# Patient Record
Sex: FEMALE | Race: OTHER | HISPANIC OR LATINO
[De-identification: names, ages, dates, MRNs, and addresses within clinical notes are randomized per-mention and may not be internally consistent; named-entity substitution may affect disease eponyms.]

---

## 2017-06-06 ENCOUNTER — FORM ENCOUNTER (OUTPATIENT)
Age: 47
End: 2017-06-06

## 2017-11-21 ENCOUNTER — FORM ENCOUNTER (OUTPATIENT)
Age: 47
End: 2017-11-21

## 2018-03-13 ENCOUNTER — FORM ENCOUNTER (OUTPATIENT)
Age: 48
End: 2018-03-13

## 2018-10-31 ENCOUNTER — FORM ENCOUNTER (OUTPATIENT)
Age: 48
End: 2018-10-31

## 2019-01-14 ENCOUNTER — EMERGENCY (EMERGENCY)
Facility: HOSPITAL | Age: 49
LOS: 1 days | Discharge: HOME | End: 2019-01-14
Admitting: EMERGENCY MEDICINE
Payer: MEDICAID

## 2019-01-14 VITALS
HEIGHT: 59 IN | DIASTOLIC BLOOD PRESSURE: 77 MMHG | WEIGHT: 143.08 LBS | HEART RATE: 86 BPM | SYSTOLIC BLOOD PRESSURE: 118 MMHG | OXYGEN SATURATION: 98 % | TEMPERATURE: 99 F | RESPIRATION RATE: 18 BRPM

## 2019-01-14 DIAGNOSIS — R51 HEADACHE: ICD-10-CM

## 2019-01-14 DIAGNOSIS — H57.89 OTHER SPECIFIED DISORDERS OF EYE AND ADNEXA: ICD-10-CM

## 2019-01-14 LAB
ANION GAP SERPL CALC-SCNC: 10 MMOL/L — SIGNIFICANT CHANGE UP (ref 9–16)
BASOPHILS NFR BLD AUTO: 1 % — SIGNIFICANT CHANGE UP (ref 0–2)
BUN SERPL-MCNC: 19 MG/DL — SIGNIFICANT CHANGE UP (ref 7–23)
CALCIUM SERPL-MCNC: 9.2 MG/DL — SIGNIFICANT CHANGE UP (ref 8.5–10.5)
CHLORIDE SERPL-SCNC: 104 MMOL/L — SIGNIFICANT CHANGE UP (ref 96–108)
CO2 SERPL-SCNC: 24 MMOL/L — SIGNIFICANT CHANGE UP (ref 22–31)
CREAT SERPL-MCNC: 0.67 MG/DL — SIGNIFICANT CHANGE UP (ref 0.5–1.3)
EOSINOPHIL NFR BLD AUTO: 5.1 % — SIGNIFICANT CHANGE UP (ref 0–6)
GLUCOSE SERPL-MCNC: 98 MG/DL — SIGNIFICANT CHANGE UP (ref 70–99)
HCT VFR BLD CALC: 38.7 % — SIGNIFICANT CHANGE UP (ref 34.5–45)
HGB BLD-MCNC: 13 G/DL — SIGNIFICANT CHANGE UP (ref 11.5–15.5)
IMM GRANULOCYTES NFR BLD AUTO: 0.2 % — SIGNIFICANT CHANGE UP (ref 0–1.5)
LYMPHOCYTES # BLD AUTO: 49.5 % — HIGH (ref 13–44)
MCHC RBC-ENTMCNC: 31.6 PG — SIGNIFICANT CHANGE UP (ref 27–34)
MCHC RBC-ENTMCNC: 33.6 G/DL — SIGNIFICANT CHANGE UP (ref 32–36)
MCV RBC AUTO: 94.2 FL — SIGNIFICANT CHANGE UP (ref 80–100)
MONOCYTES NFR BLD AUTO: 8 % — SIGNIFICANT CHANGE UP (ref 2–14)
NEUTROPHILS NFR BLD AUTO: 36.2 % — LOW (ref 43–77)
PLATELET # BLD AUTO: 237 K/UL — SIGNIFICANT CHANGE UP (ref 150–400)
POTASSIUM SERPL-MCNC: 4.1 MMOL/L — SIGNIFICANT CHANGE UP (ref 3.5–5.3)
POTASSIUM SERPL-SCNC: 4.1 MMOL/L — SIGNIFICANT CHANGE UP (ref 3.5–5.3)
RBC # BLD: 4.11 M/UL — SIGNIFICANT CHANGE UP (ref 3.8–5.2)
RBC # FLD: 11.9 % — SIGNIFICANT CHANGE UP (ref 10.3–14.5)
SODIUM SERPL-SCNC: 138 MMOL/L — SIGNIFICANT CHANGE UP (ref 132–145)
WBC # BLD: 5.2 K/UL — SIGNIFICANT CHANGE UP (ref 3.8–10.5)
WBC # FLD AUTO: 5.2 K/UL — SIGNIFICANT CHANGE UP (ref 3.8–10.5)

## 2019-01-14 PROCEDURE — 99284 EMERGENCY DEPT VISIT MOD MDM: CPT

## 2019-01-14 RX ORDER — ACETAMINOPHEN 500 MG
650 TABLET ORAL ONCE
Qty: 0 | Refills: 0 | Status: COMPLETED | OUTPATIENT
Start: 2019-01-14 | End: 2019-01-14

## 2019-01-14 RX ORDER — SODIUM CHLORIDE 9 MG/ML
1000 INJECTION INTRAMUSCULAR; INTRAVENOUS; SUBCUTANEOUS ONCE
Qty: 0 | Refills: 0 | Status: COMPLETED | OUTPATIENT
Start: 2019-01-14 | End: 2019-01-14

## 2019-01-14 RX ORDER — METOCLOPRAMIDE HCL 10 MG
10 TABLET ORAL ONCE
Qty: 0 | Refills: 0 | Status: COMPLETED | OUTPATIENT
Start: 2019-01-14 | End: 2019-01-14

## 2019-01-14 RX ORDER — ACETAMINOPHEN 500 MG
2 TABLET ORAL
Qty: 30 | Refills: 0 | OUTPATIENT
Start: 2019-01-14 | End: 2019-01-18

## 2019-01-14 RX ORDER — METOCLOPRAMIDE HCL 10 MG
1 TABLET ORAL
Qty: 15 | Refills: 0 | OUTPATIENT
Start: 2019-01-14 | End: 2019-01-18

## 2019-01-14 RX ADMIN — Medication 10 MILLIGRAM(S): at 17:35

## 2019-01-14 RX ADMIN — Medication 650 MILLIGRAM(S): at 17:35

## 2019-01-14 RX ADMIN — SODIUM CHLORIDE 2000 MILLILITER(S): 9 INJECTION INTRAMUSCULAR; INTRAVENOUS; SUBCUTANEOUS at 17:35

## 2019-01-14 NOTE — ED PROVIDER NOTE - MEDICAL DECISION MAKING DETAILS
headache, likely migraine type.  Given iv fluids, tylenol and reglan with relief.  Labs unremarkable. No indication for imagining at this time.  Pt with nonfocal exam.  F/u with neurology for possible mri and eval.  no s/s of infection or other etiology

## 2019-01-14 NOTE — ED ADULT NURSE NOTE - NSIMPLEMENTINTERV_GEN_ALL_ED
Implemented All Universal Safety Interventions:  [place] to call system. Call bell, personal items and telephone within reach. Instruct patient to call for assistance. Room bathroom lighting operational. Non-slip footwear when patient is off stretcher. Physically safe environment: no spills, clutter or unnecessary equipment. Stretcher in lowest position, wheels locked, appropriate side rails in place.

## 2019-01-14 NOTE — ED ADULT NURSE NOTE - CHPI ED NUR SYMPTOMS NEG
no fever/no nausea/no numbness/no dizziness/no blurred vision/no confusion/no vomiting/no weakness/no loss of consciousness/no change in level of consciousness

## 2019-01-14 NOTE — ED ADULT TRIAGE NOTE - CHIEF COMPLAINT QUOTE
Pt presents to ED with c/o headache with bilateral blurred vision and sensitivity to light x 2 days. Denies fevers, chills or neck pain. Pt has had headaches like this in the past, tried Motrin with minimal relief. Neuro intact.

## 2019-01-14 NOTE — ED ADULT NURSE NOTE - OBJECTIVE STATEMENT
48 y.o F presents to ED with complaints of headache x 2 days. Pt reports similar symptoms 3x in past with relief taking ibuprofen. Pt took ibuprofen yesterday with no relief. No medications for pain today. Pt reports headache worse with light exposure. Denies N/V, dizziness ,fever/chills, neck pain, back pain.

## 2019-01-14 NOTE — ED PROVIDER NOTE - OBJECTIVE STATEMENT
49 y/o F w/ pmhx of breast cancer presents to ED w/ c/o "very severe" headache w/ sensitivity to light x2 days. Pt states that she has had this kind of headache twice before. Usually takes motrin for ha, has not taken anything today for pain. Denies fever, congestion, cough, weakness. NKA. 49 y/o F w/ pmhx of breast cancer presents to ED w/ c/o "very severe" headache w/ sensitivity to light x2 days. Pt states that she has had this kind of headache twice before and resolved with motrin and time and was told she had migraine headaches. Usually takes motrin for ha, has not taken anything today for pain. Denies fever, congestion, cough, weakness. NKA.

## 2019-01-14 NOTE — ED PROVIDER NOTE - NSFOLLOWUPCLINICS_GEN_ALL_ED_FT
[facility]  Neurology  [address] [place] [address]  Phone: [phone]  Fax: [phone]  Follow Up Time:

## 2019-05-01 ENCOUNTER — FORM ENCOUNTER (OUTPATIENT)
Age: 49
End: 2019-05-01

## 2019-11-25 ENCOUNTER — FORM ENCOUNTER (OUTPATIENT)
Age: 49
End: 2019-11-25

## 2021-01-08 PROBLEM — C50.919 MALIGNANT NEOPLASM OF UNSPECIFIED SITE OF UNSPECIFIED FEMALE BREAST: Chronic | Status: ACTIVE | Noted: 2019-01-14

## 2021-06-11 PROBLEM — Z00.00 ENCOUNTER FOR PREVENTIVE HEALTH EXAMINATION: Status: ACTIVE | Noted: 2021-06-11

## 2021-06-14 ENCOUNTER — APPOINTMENT (OUTPATIENT)
Dept: BREAST CENTER | Facility: CLINIC | Age: 51
End: 2021-06-14
Payer: MEDICAID

## 2021-06-14 ENCOUNTER — NON-APPOINTMENT (OUTPATIENT)
Age: 51
End: 2021-06-14

## 2021-06-14 VITALS
BODY MASS INDEX: 26.7 KG/M2 | WEIGHT: 132.44 LBS | DIASTOLIC BLOOD PRESSURE: 73 MMHG | HEART RATE: 63 BPM | SYSTOLIC BLOOD PRESSURE: 112 MMHG | HEIGHT: 59 IN

## 2021-06-14 PROCEDURE — 99214 OFFICE O/P EST MOD 30 MIN: CPT

## 2022-01-03 PROBLEM — N64.9 DISORDER OF BREAST, UNSPECIFIED: Status: ACTIVE | Noted: 2021-06-11

## 2022-01-10 ENCOUNTER — NON-APPOINTMENT (OUTPATIENT)
Age: 52
End: 2022-01-10

## 2022-01-10 ENCOUNTER — APPOINTMENT (OUTPATIENT)
Dept: BREAST CENTER | Facility: CLINIC | Age: 52
End: 2022-01-10
Payer: MEDICAID

## 2022-01-10 VITALS
BODY MASS INDEX: 29.23 KG/M2 | SYSTOLIC BLOOD PRESSURE: 139 MMHG | WEIGHT: 145 LBS | DIASTOLIC BLOOD PRESSURE: 81 MMHG | HEIGHT: 59 IN | HEART RATE: 98 BPM

## 2022-01-10 DIAGNOSIS — N64.9 DISORDER OF BREAST, UNSPECIFIED: ICD-10-CM

## 2022-01-10 DIAGNOSIS — Z78.9 OTHER SPECIFIED HEALTH STATUS: ICD-10-CM

## 2022-01-10 PROCEDURE — 99214 OFFICE O/P EST MOD 30 MIN: CPT

## 2023-01-11 ENCOUNTER — APPOINTMENT (OUTPATIENT)
Dept: BREAST CENTER | Facility: CLINIC | Age: 53
End: 2023-01-11

## 2023-01-20 ENCOUNTER — NON-APPOINTMENT (OUTPATIENT)
Age: 53
End: 2023-01-20

## 2023-01-24 ENCOUNTER — APPOINTMENT (OUTPATIENT)
Dept: BREAST CENTER | Facility: CLINIC | Age: 53
End: 2023-01-24

## 2023-02-07 ENCOUNTER — APPOINTMENT (OUTPATIENT)
Dept: BREAST CENTER | Facility: CLINIC | Age: 53
End: 2023-02-07
Payer: MEDICAID

## 2023-02-07 VITALS
SYSTOLIC BLOOD PRESSURE: 123 MMHG | WEIGHT: 154 LBS | DIASTOLIC BLOOD PRESSURE: 75 MMHG | HEART RATE: 84 BPM | BODY MASS INDEX: 31.04 KG/M2 | HEIGHT: 59 IN

## 2023-02-07 DIAGNOSIS — Z80.3 FAMILY HISTORY OF MALIGNANT NEOPLASM OF BREAST: ICD-10-CM

## 2023-02-07 DIAGNOSIS — R29.898 OTHER SYMPTOMS AND SIGNS INVOLVING THE MUSCULOSKELETAL SYSTEM: ICD-10-CM

## 2023-02-07 PROCEDURE — 99213 OFFICE O/P EST LOW 20 MIN: CPT

## 2023-02-10 PROBLEM — R29.898: Status: ACTIVE | Noted: 2023-02-10

## 2023-02-10 PROBLEM — Z80.3 FAMILY HISTORY OF BREAST CANCER: Status: ACTIVE | Noted: 2023-02-10

## 2023-02-10 NOTE — PAST MEDICAL HISTORY
[Surgical Menopause] : The patient is in surgical menopause [Menarche Age ____] : age at menarche was [unfilled] [Total Preg ___] : G[unfilled] [Live Births ___] : P[unfilled]  [Age At Live Birth ___] : Age at live birth: [unfilled] [History of Hormone Replacement Treatment] : has no history of hormone replacement treatment [de-identified] : 2013 s/p hysterectomy  [FreeTextEntry6] : No [FreeTextEntry7] : No

## 2023-02-10 NOTE — PHYSICAL EXAM
[Normocephalic] : normocephalic [Supple] : supple [Examined in the supine and seated position] : examined in the supine and seated position [No dominant masses] : no dominant masses in right breast  [No dominant masses] : no dominant masses left breast [No Nipple Retraction] : no left nipple retraction [No Nipple Discharge] : no left nipple discharge [No Axillary Lymphadenopathy] : no left axillary lymphadenopathy [No Edema] : no edema [No Swelling] : no swelling [Full ROM] : full range of motion [de-identified] : B/L implants intact  [de-identified] : mastectomy incision well healed  [de-identified] : mastectomy incision well healed

## 2023-02-10 NOTE — HISTORY OF PRESENT ILLNESS
[FreeTextEntry1] : Patient is a 51 yo female who presents for breast cancer surveillance. 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 Briceno and Dr. Steele.  No endocrine therapy. Patient reports left upper extremity tightness first noted about 1 year ago, states it is intermittent, but most notable when she is wearing long sleeves, the left arm feels tighter fitting than right. 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 Update testing was discussed however patient did not meet with a genetic counselor for panel testing.  Patient denies palpable masses or skin changes. \par \par \par \par

## 2023-03-01 ENCOUNTER — APPOINTMENT (OUTPATIENT)
Dept: OBGYN | Facility: CLINIC | Age: 53
End: 2023-03-01
Payer: MEDICAID

## 2023-03-01 ENCOUNTER — NON-APPOINTMENT (OUTPATIENT)
Age: 53
End: 2023-03-01

## 2023-03-01 VITALS — SYSTOLIC BLOOD PRESSURE: 110 MMHG | DIASTOLIC BLOOD PRESSURE: 80 MMHG | BODY MASS INDEX: 31.31 KG/M2 | WEIGHT: 155 LBS

## 2023-03-01 PROCEDURE — 99203 OFFICE O/P NEW LOW 30 MIN: CPT

## 2023-03-01 NOTE — HISTORY OF PRESENT ILLNESS
[Post-Menopause, No Sxs] : post-menopausal, currently without symptoms [Previously active] : previously active [FreeTextEntry1] :  [id number] (Jose) utilized for this visit\par \par Ms. Abrams is a  presenting for the 1st time to this office for c/o 2yrs vaginal and vulvar "Bumps" and irritation.\par \par OBHx:  x 2\par GynHx: LORE/BS  for fibroid uterus; vaginal pap last year: normal per pt, denies hx abnl paps, ovarian cysts\par MedHx: preDM;  hx breast CA\par SurgHx: as above, b/l mastectomy and SLND for DCIS/ER+ breast CA following with breast surgeon annually\par Meds: none\par All: NKDA\par SocHx: denies toxic habits x 3 [PapSmeardate] : 2/2022 [LMPDate] : alysia

## 2023-03-01 NOTE — PLAN
[FreeTextEntry1] : - Discussed that these are most likely inclusion cysts and normally don't require removal but pt is insistent that they are bothering her and she would like to have them removed. For now, she will use Sitz baths and return for an early morning appointment to attempt removal of these lesions.

## 2023-03-01 NOTE — PHYSICAL EXAM
[Appropriately responsive] : appropriately responsive [Alert] : alert [No Acute Distress] : no acute distress [Regular Rate Rhythm] : regular rate rhythm [Soft] : soft [Non-tender] : non-tender [Non-distended] : non-distended [No Mass] : no mass [Oriented x3] : oriented x3 [Normal] : normal [Pink Rugae] : pink rugae [Absent] : absent [Uterine Adnexae] : non-palpable [FreeTextEntry5] : clinically clear [FreeTextEntry1] : skin-colored, mobile, hard, nonerythematous, nonedematous lesions: 2 < 5mm lesions on the right labia majora ~ 7-9 o'clock and a larger 1-2cm inclusion cyst at the inferior border of the left labia majora [FreeTextEntry2] : as above

## 2023-03-08 ENCOUNTER — APPOINTMENT (OUTPATIENT)
Dept: HEMATOLOGY ONCOLOGY | Facility: CLINIC | Age: 53
End: 2023-03-08

## 2023-03-08 DIAGNOSIS — N90.89 OTHER SPECIFIED NONINFLAMMATORY DISORDERS OF VULVA AND PERINEUM: ICD-10-CM

## 2023-03-09 NOTE — DISCUSSION/SUMMARY
[FreeTextEntry1] : REASON FOR CONSULT\par Kaity Abrams is a 52-year-old female referred by Magdalene Corral NP for cancer genetic counseling and risk assessment due to a personal history of breast cancer and a family history of breast cancer. Ms. Abrams was seen on 2023, at which time medical and family history was ascertained and a pedigree constructed. This consultation was carried out with the aid of a  #[id number].\par \par RELEVANT MEDICAL HISTORY\par Ms. Abrams was diagnosed with left breast cancer at 40 years old. 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 Consequently, she underwent a bilateral mastectomy with implant reconstruction.\par \par Of note, Ms. Abrams had genetic testing for the BRCA1 and BRCA2 genes  which was reportedly negative. Report was not available for review at today’s appointment.\par \par OTHER MEDICAL AND SURGICAL HISTORY:\par •	Medical History: No major medical history reported\par •	Surgical History: bilateral mastectomy with implant reconstruction, hysterectomy (due to fibroids; )\par \par OB/GYN HISTORY:\par Obstetrical History: \par Age at Menarche: 13\par Menopausal Status: Post-menopausal with LMP at age 42 \par Age at First Live Birth: 21\par Oral Contraceptive Use: Yes, approximately 2 months in total\par Hormone Replacement Therapy: No\par \par CANCER SCREENING HISTORY:  \par Breast: \par •	Mammography: Patient reports that she has not had a mammogram since her bilateral mastectomy ().\par •	Sonography: Patient reports that she has not had a breast ultrasound since her bilateral mastectomy ().\par •	MRI: last 3-4 years ago, reportedly normal\par •	Biopsies: -Left, DCIS\par GYN:\par •	Pelvic Examination: last 2023, pending results\par •	Sonography: last 1-1.5 years ago due to fibroids, reportedly normal\par •	CA-125: No\par Colon:\par •	Colonoscopy: last 3 years ago, patient reports that she is unsure if polyps were identified. Next colonoscopy was recommended in 5 years. This was the patient’s first colonoscopy.\par •	Upper Endoscopy: last >3 years ago due to GERD, reportedly normal\par •	FOBT: No\par Skin:  \par •	FBSE: No\par •	Lesions biopsied/removed: No\par \par SOCIAL HISTORY:\par •	Tobacco-product use: No\par •	Environmental exposures: No \par \par FAMILY HISTORY:\par Maternal ancestry and paternal ancestry were reported as [place]. Ashkenazi Latter-day ancestry and consanguinity were denied. A detailed family history of cancer was ascertained, see below and scanned chart for pedigree. \par \par Of note, Ms. Abrams reports that her daughter had genetic testing recently which was negative. Report was not available for review at today’s appointment. \par 	\par 	RISK ASSESSMENT:\par Ms. Abrams’s personal and family history is suggestive of a hereditary cancer syndrome given her breast cancer diagnosis at 40 years old, her mother’s breast cancer diagnosis at 43 years old, and her maternal uncle’s unknown cancer diagnosis in his 60’s. The patient meets National Comprehensive Cancer Network (NCCN) criteria for genetic testing. We recommended updated genetic testing for genes associated with breast cancer. This test analyzes [11] genes: WIN, BARD1, BRCA1, BRCA2, CDH1, CHEK2, NF1, PALB2, PTEN, STK11, and TP53.\par \par The risks, benefits and limitations of genetic testing were discussed with Ms. Abrams. In addition, we discussed the purpose of genetic testing and possible test results (positive, negative, inconclusive) along with associated medical management options and psychosocial implications. Insurance coverage and potential out of pocket costs were also discussed. \par \par It was explained that risk assessment is based upon medical and family history as provided and may change in the future should new information be obtained. \par \par Following our discussion, Ms. Abrams consented to the above-mentioned genetic testing panel. Blood was drawn in our laboratory and sent to [facility] today.\par \par PLAN:\par \par 1.	Blood drawn today will be sent to [facility] for analysis. \par 2.	We will contact Ms. Abrams to schedule a follow-up appointment once the results are available. Results generally return in 2-3 weeks. \par 3.	Ms. Abrams signed a medical release to allow discussion of her genetics information with her daughter. This will be scanned into her chart.\par 4.	Ms. Abrams signed a medical release to allow [facility] to share her previous genetic testing results with [facility]. This will be scanned into her chart.\par \par For any additional questions please call [facility] at [phone]. \par \par Renee Ca MS, [facility] [place]par Genetic Counselor, [facility] Genetics\par

## 2023-04-11 ENCOUNTER — NON-APPOINTMENT (OUTPATIENT)
Age: 53
End: 2023-04-11

## 2023-05-11 ENCOUNTER — APPOINTMENT (OUTPATIENT)
Dept: OBGYN | Facility: CLINIC | Age: 53
End: 2023-05-11

## 2023-08-10 ENCOUNTER — NON-APPOINTMENT (OUTPATIENT)
Age: 53
End: 2023-08-10

## 2023-08-10 ENCOUNTER — APPOINTMENT (OUTPATIENT)
Dept: BREAST CENTER | Facility: CLINIC | Age: 53
End: 2023-08-10
Payer: MEDICAID

## 2023-08-10 VITALS
HEART RATE: 69 BPM | BODY MASS INDEX: 29.64 KG/M2 | DIASTOLIC BLOOD PRESSURE: 75 MMHG | WEIGHT: 147 LBS | SYSTOLIC BLOOD PRESSURE: 122 MMHG | HEIGHT: 59 IN

## 2023-08-10 DIAGNOSIS — Z80.3 FAMILY HISTORY OF MALIGNANT NEOPLASM OF BREAST: ICD-10-CM

## 2023-08-10 DIAGNOSIS — Z08 ENCOUNTER FOR FOLLOW-UP EXAMINATION AFTER COMPLETED TREATMENT FOR MALIGNANT NEOPLASM: ICD-10-CM

## 2023-08-10 DIAGNOSIS — Z85.3 ENCOUNTER FOR FOLLOW-UP EXAMINATION AFTER COMPLETED TREATMENT FOR MALIGNANT NEOPLASM: ICD-10-CM

## 2023-08-10 DIAGNOSIS — Z85.3 PERSONAL HISTORY OF MALIGNANT NEOPLASM OF BREAST: ICD-10-CM

## 2023-08-10 DIAGNOSIS — Z90.13 ACQUIRED ABSENCE OF BILATERAL BREASTS AND NIPPLES: ICD-10-CM

## 2023-08-10 PROCEDURE — 99213 OFFICE O/P EST LOW 20 MIN: CPT

## 2023-08-10 RX ORDER — ESOMEPRAZOLE MAGNESIUM 20 MG/1
20 GRANULE, DELAYED RELEASE ORAL
Refills: 0 | Status: DISCONTINUED | COMMUNITY
End: 2023-08-10

## 2023-08-10 NOTE — HISTORY OF PRESENT ILLNESS
[FreeTextEntry1] : Patient is a 51yo Syriac speaking only F who presents for breast cancer surveillance. Patient is CHEK2+ and VUS in WIN (tested 2023). She has history of B/l mastectomies & SNB in October 2011 (age 40) w/ bilateral implant recon by Dr. Briceno and Dr. Steele. Surgical pathology yielded extensive LEFT DCIS (ER+), negative margins, 0/9  LN. Family history of breast cancer in mother (age 43). Patient denies palpable masses or skin changes bilaterally.  TNM Staging: pTis, pN0, M0  10/2011: Bilateral mastectomy w/ implant recon by Dr. Briceno (retropec silicone)- R- benign breast & LN 0/3, L- intermediate DCIS ER +, LN 0/9,negative margins

## 2023-08-10 NOTE — PHYSICAL EXAM
[de-identified] : B/l chest wall without evidence of recurrence bilaterally  centrally located sebaceous cyst- sebum expressed

## 2023-08-10 NOTE — PAST MEDICAL HISTORY
[History of Hormone Replacement Treatment] : has no history of hormone replacement treatment [de-identified] : 2013 s/p hysterectomy  [FreeTextEntry6] : No [FreeTextEntry7] : No

## 2023-08-24 ENCOUNTER — APPOINTMENT (OUTPATIENT)
Dept: OBGYN | Facility: CLINIC | Age: 53
End: 2023-08-24

## 2023-12-15 ENCOUNTER — NON-APPOINTMENT (OUTPATIENT)
Age: 53
End: 2023-12-15

## 2023-12-15 NOTE — DISCUSSION/SUMMARY
[FreeTextEntry1] : Patient was contacted multiple times by our Cancer Genetics scheduling team to reschedule her collaborative visit to discuss her positive genetic test results. Patient has not returned calls to schedule an appointment. At this time, we will stop outreach and wait for the patient to return our calls to schedule an appointment. We remain available to her should she wish to make an appointment at any time in the future. A certified letter was sent to notify the patient.   Renee Ca MS, [facility] Genetic Counselor, Cancer Genetics

## 2024-08-07 ENCOUNTER — NON-APPOINTMENT (OUTPATIENT)
Age: 54
End: 2024-08-07

## 2024-08-07 NOTE — PHYSICAL EXAM
[Normocephalic] : normocephalic [EOMI] : extra ocular movement intact [Supple] : supple [No Supraclavicular Adenopathy] : no supraclavicular adenopathy [No Cervical Adenopathy] : no cervical adenopathy [No Edema] : no edema [No Swelling] : no swelling [No Rashes] : no rashes [No Ulceration] : no ulceration [de-identified] : B/l chest wall without evidence of recurrence bilaterally  centrally located sebaceous cyst- sebum expressed

## 2024-08-07 NOTE — PAST MEDICAL HISTORY
[Surgical Menopause] : The patient is in surgical menopause [Menarche Age ____] : age at menarche was [unfilled] [History of Hormone Replacement Treatment] : has no history of hormone replacement treatment [Total Preg ___] : G[unfilled] [Live Births ___] : P[unfilled]  [Age At Live Birth ___] : Age at live birth: [unfilled] [de-identified] : 2013 s/p hysterectomy  [FreeTextEntry6] : No [FreeTextEntry7] : No

## 2024-08-07 NOTE — HISTORY OF PRESENT ILLNESS
[FreeTextEntry1] : Patient is a 52yo Ugandan speaking only F who presents for breast cancer surveillance. Patient is CHEK2+ and VUS in WIN (tested 2023). She has history of B/l mastectomies & SNB in October 2011 (age 40) w/ bilateral implant recon by Dr. Briceno and Dr. Steele. Surgical pathology yielded extensive LEFT DCIS (ER+), negative margins, 0/9  LN. Hx of sebaceous cyst for which she was referred to Dr. Briceno for surgical excision as patient was interested in having it removed ????. Family history of breast cancer in mother (age 43). Patient denies palpable masses or skin changes bilaterally.  TNM Staging: pTis, pN0, M0  10/2011: Bilateral mastectomy w/ implant recon by Dr. Briceno (retropec silicone)- R- benign breast & LN 0/3, L- intermediate DCIS ER +, LN 0/9,negative margins

## 2024-08-21 ENCOUNTER — APPOINTMENT (OUTPATIENT)
Dept: BREAST CENTER | Facility: CLINIC | Age: 54
End: 2024-08-21
Payer: MEDICAID

## 2024-08-21 VITALS
SYSTOLIC BLOOD PRESSURE: 129 MMHG | WEIGHT: 157 LBS | HEIGHT: 59 IN | HEART RATE: 73 BPM | DIASTOLIC BLOOD PRESSURE: 77 MMHG | BODY MASS INDEX: 31.65 KG/M2

## 2024-08-21 DIAGNOSIS — Z08 ENCOUNTER FOR FOLLOW-UP EXAMINATION AFTER COMPLETED TREATMENT FOR MALIGNANT NEOPLASM: ICD-10-CM

## 2024-08-21 DIAGNOSIS — Z85.3 PERSONAL HISTORY OF MALIGNANT NEOPLASM OF BREAST: ICD-10-CM

## 2024-08-21 DIAGNOSIS — Z90.13 ACQUIRED ABSENCE OF BILATERAL BREASTS AND NIPPLES: ICD-10-CM

## 2024-08-21 DIAGNOSIS — Z85.3 ENCOUNTER FOR FOLLOW-UP EXAMINATION AFTER COMPLETED TREATMENT FOR MALIGNANT NEOPLASM: ICD-10-CM

## 2024-08-21 PROCEDURE — 99213 OFFICE O/P EST LOW 20 MIN: CPT

## 2024-08-21 PROCEDURE — 93702 BIS XTRACELL FLUID ANALYSIS: CPT | Mod: NC

## 2024-08-21 NOTE — PHYSICAL EXAM
[de-identified] : B/l chest wall without evidence of recurrence bilaterally  centrally located sebaceous cyst- sebum expressed

## 2024-08-21 NOTE — PHYSICAL EXAM
[de-identified] : B/l chest wall without evidence of recurrence bilaterally  centrally located sebaceous cyst- sebum expressed

## 2024-08-21 NOTE — HISTORY OF PRESENT ILLNESS
[FreeTextEntry1] : Patient is a 54yo [place] Islander speaking only F who presents for breast cancer surveillance. Patient is CHEK2+ and VUS in WIN (tested 2023). She has history of B/l mastectomies & SNB in October 2011 (age 40) w/ bilateral implant recon by Dr. Briceno and Dr. Steele. Surgical pathology yielded extensive LEFT DCIS (ER+), negative margins, 0/9  LN. Hx of sebaceous cyst for which she was referred to Dr. Briceno for surgical excision. Family history of breast cancer in mother (age 43). Patient denies palpable masses or skin changes bilaterally.  TNM Staging: pTis, pN0, M0  10/2011: Bilateral mastectomy w/ implant recon by Dr. Briceno (retropec silicone)- R- benign breast & LN 0/3, L- intermediate DCIS ER +, LN 0/9,negative margins

## 2024-08-21 NOTE — HISTORY OF PRESENT ILLNESS
[FreeTextEntry1] : Patient is a 54yo Venezuelan speaking only F who presents for breast cancer surveillance. Patient is CHEK2+ and VUS in WIN (tested 2023). She has history of B/l mastectomies & SNB in October 2011 (age 40) w/ bilateral implant recon by Dr. Briceno and Dr. Steele. Surgical pathology yielded extensive LEFT DCIS (ER+), negative margins, 0/9  LN. Hx of sebaceous cyst for which she was referred to Dr. Briceno for surgical excision. Family history of breast cancer in mother (age 43). Patient denies palpable masses or skin changes bilaterally.  TNM Staging: pTis, pN0, M0  10/2011: Bilateral mastectomy w/ implant recon by Dr. Briceno (retropec silicone)- R- benign breast & LN 0/3, L- intermediate DCIS ER +, LN 0/9,negative margins

## 2024-08-21 NOTE — PAST MEDICAL HISTORY
[History of Hormone Replacement Treatment] : has no history of hormone replacement treatment [de-identified] : 2013 s/p hysterectomy  [FreeTextEntry6] : No [FreeTextEntry7] : No

## 2024-08-21 NOTE — PAST MEDICAL HISTORY
[History of Hormone Replacement Treatment] : has no history of hormone replacement treatment [de-identified] : 2013 s/p hysterectomy  [FreeTextEntry6] : No [FreeTextEntry7] : No

## 2025-01-13 ENCOUNTER — APPOINTMENT (OUTPATIENT)
Dept: HEMATOLOGY ONCOLOGY | Facility: CLINIC | Age: 55
End: 2025-01-13

## 2025-07-14 ENCOUNTER — NON-APPOINTMENT (OUTPATIENT)
Age: 55
End: 2025-07-14

## 2025-09-03 ENCOUNTER — APPOINTMENT (OUTPATIENT)
Dept: BREAST CENTER | Facility: CLINIC | Age: 55
End: 2025-09-03